# Patient Record
Sex: MALE | Race: WHITE | URBAN - METROPOLITAN AREA
[De-identification: names, ages, dates, MRNs, and addresses within clinical notes are randomized per-mention and may not be internally consistent; named-entity substitution may affect disease eponyms.]

---

## 2019-04-16 NOTE — PATIENT DISCUSSION
(H25.11) Age-related nuclear cataract, right eye - Assesment : Examination revealed cataract OD. Pt is bothered and symptomatic by vision and anisometropia. Pt leaving to go to [facility] for the Summer. - Plan : Monitor for changes. Advised patient of condition of cataract OD and explained anisometropia. Recommended CE OD to improve visual function and imbalance. Discussed option of proceeding in [facility] or when returns here in the Fall. Discussed procedure, risks, and benefits. Pt will consider option and where would like to proceed. RTC here in 1 year for Exam (or in Fall for Cat Eval; pt can call to schedule), sooner if problems or changes.

## 2019-04-16 NOTE — PATIENT DISCUSSION
(H35.373) Puckering of macula, bilateral - Assesment : Examination revealed ERM OU-mild. - Plan : Monitor for macular changes. Advised patient to call our office with decreased vision or increased symptoms.

## 2019-10-14 ENCOUNTER — IMPORTED ENCOUNTER (OUTPATIENT)
Dept: URBAN - METROPOLITAN AREA CLINIC 1 | Facility: CLINIC | Age: 59
End: 2019-10-14

## 2019-10-14 PROBLEM — H20.9: Noted: 2019-10-14

## 2019-10-14 PROCEDURE — 92002 INTRM OPH EXAM NEW PATIENT: CPT

## 2019-10-19 ENCOUNTER — IMPORTED ENCOUNTER (OUTPATIENT)
Dept: URBAN - METROPOLITAN AREA CLINIC 1 | Facility: CLINIC | Age: 59
End: 2019-10-19

## 2019-10-19 PROBLEM — H25.813: Noted: 2019-10-19

## 2019-10-19 PROBLEM — H20.9: Noted: 2019-10-19

## 2019-10-19 PROCEDURE — 92014 COMPRE OPH EXAM EST PT 1/>: CPT

## 2019-11-04 ENCOUNTER — IMPORTED ENCOUNTER (OUTPATIENT)
Dept: URBAN - METROPOLITAN AREA CLINIC 1 | Facility: CLINIC | Age: 59
End: 2019-11-04

## 2019-11-04 PROCEDURE — 92012 INTRM OPH EXAM EST PATIENT: CPT

## 2019-12-20 ENCOUNTER — IMPORTED ENCOUNTER (OUTPATIENT)
Dept: URBAN - METROPOLITAN AREA CLINIC 1 | Facility: CLINIC | Age: 59
End: 2019-12-20

## 2019-12-20 PROCEDURE — 92012 INTRM OPH EXAM EST PATIENT: CPT

## 2020-02-07 ENCOUNTER — IMPORTED ENCOUNTER (OUTPATIENT)
Dept: URBAN - METROPOLITAN AREA CLINIC 1 | Facility: CLINIC | Age: 60
End: 2020-02-07

## 2020-02-07 PROBLEM — H20.9: Noted: 2020-02-07

## 2020-02-07 PROCEDURE — 99213 OFFICE O/P EST LOW 20 MIN: CPT

## 2020-03-17 ENCOUNTER — IMPORTED ENCOUNTER (OUTPATIENT)
Dept: URBAN - METROPOLITAN AREA CLINIC 1 | Facility: CLINIC | Age: 60
End: 2020-03-17

## 2020-03-17 PROBLEM — H20.9: Noted: 2020-03-17

## 2020-03-17 PROCEDURE — 99213 OFFICE O/P EST LOW 20 MIN: CPT

## 2020-10-13 ENCOUNTER — IMPORTED ENCOUNTER (OUTPATIENT)
Dept: URBAN - METROPOLITAN AREA CLINIC 1 | Facility: CLINIC | Age: 60
End: 2020-10-13

## 2020-10-13 PROBLEM — H25.813: Noted: 2020-10-13

## 2020-10-13 PROBLEM — H16.143: Noted: 2020-10-13

## 2020-10-13 PROBLEM — H04.123: Noted: 2020-10-13

## 2020-10-13 PROBLEM — H10.45: Noted: 2020-10-13

## 2020-10-13 PROCEDURE — 92014 COMPRE OPH EXAM EST PT 1/>: CPT

## 2021-04-13 ENCOUNTER — IMPORTED ENCOUNTER (OUTPATIENT)
Dept: URBAN - METROPOLITAN AREA CLINIC 1 | Facility: CLINIC | Age: 61
End: 2021-04-13

## 2021-04-13 PROBLEM — H10.45: Noted: 2021-04-13

## 2021-04-13 PROBLEM — H04.123: Noted: 2021-04-13

## 2021-04-13 PROBLEM — H16.143: Noted: 2021-04-13

## 2021-04-13 PROBLEM — H25.813: Noted: 2021-04-13

## 2021-04-13 PROCEDURE — 92014 COMPRE OPH EXAM EST PT 1/>: CPT

## 2021-10-26 ENCOUNTER — IMPORTED ENCOUNTER (OUTPATIENT)
Dept: URBAN - METROPOLITAN AREA CLINIC 1 | Facility: CLINIC | Age: 61
End: 2021-10-26

## 2021-10-26 PROBLEM — H25.813: Noted: 2021-10-26

## 2021-10-26 PROBLEM — H10.45: Noted: 2021-10-26

## 2021-10-26 PROBLEM — H16.143: Noted: 2021-10-26

## 2021-10-26 PROBLEM — H04.123: Noted: 2021-10-26

## 2021-10-26 PROCEDURE — 92014 COMPRE OPH EXAM EST PT 1/>: CPT

## 2022-04-02 ASSESSMENT — VISUAL ACUITY
OS_CC: 20/20
OD_CC: 20/20
OS_CC: 20/30-1
OS_CC: 20/25+2
OD_CC: 20/25-1
OS_CC: 20/20
OD_CC: 20/20
OD_GLARE: 20/150
OD_CC: 20/20
OS_CC: 20/25
OD_CC: 20/20
OS_PH: SC 20/20
OS_CC: 20/20
OS_CC: 20/20
OD_GLARE: 20/80
OS_CC: 20/20
OS_GLARE: 20/400
OS_GLARE: 20/80
OS_CC: 20/25
OD_CC: 20/20
OD_PH: SC 20/20

## 2022-04-02 ASSESSMENT — TONOMETRY
OS_IOP_MMHG: 13
OS_IOP_MMHG: 8
OD_IOP_MMHG: 8
OD_IOP_MMHG: 12
OS_IOP_MMHG: 10
OS_IOP_MMHG: 12
OD_IOP_MMHG: 10
OS_IOP_MMHG: 13
OD_IOP_MMHG: 13
OD_IOP_MMHG: 13
OD_IOP_MMHG: 10
OS_IOP_MMHG: 9
OS_IOP_MMHG: 13
OS_IOP_MMHG: 13
OD_IOP_MMHG: 10
OD_IOP_MMHG: 12
OD_IOP_MMHG: 13
OS_IOP_MMHG: 11

## 2022-04-02 ASSESSMENT — KERATOMETRY
OD_K2POWER_DIOPTERS: 44.50
OS_AXISANGLE_DEGREES: 013
OS_K1POWER_DIOPTERS: 42.75
OS_K2POWER_DIOPTERS: 43.75
OD_K1POWER_DIOPTERS: 43.75
OD_AXISANGLE_DEGREES: 146
OD_AXISANGLE2_DEGREES: 056
OS_AXISANGLE2_DEGREES: 103

## 2022-04-26 ENCOUNTER — COMPREHENSIVE EXAM (OUTPATIENT)
Dept: URBAN - METROPOLITAN AREA CLINIC 1 | Facility: CLINIC | Age: 62
End: 2022-04-26

## 2022-04-26 DIAGNOSIS — H16.143: ICD-10-CM

## 2022-04-26 DIAGNOSIS — H25.813: ICD-10-CM

## 2022-04-26 DIAGNOSIS — H20.13: ICD-10-CM

## 2022-04-26 DIAGNOSIS — H04.123: ICD-10-CM

## 2022-04-26 DIAGNOSIS — H10.45: ICD-10-CM

## 2022-04-26 PROCEDURE — 92014 COMPRE OPH EXAM EST PT 1/>: CPT

## 2022-04-26 ASSESSMENT — KERATOMETRY
OD_AXISANGLE2_DEGREES: 056
OD_AXISANGLE_DEGREES: 146
OS_AXISANGLE_DEGREES: 013
OS_AXISANGLE2_DEGREES: 103
OD_K2POWER_DIOPTERS: 44.50
OS_K2POWER_DIOPTERS: 43.75
OS_K1POWER_DIOPTERS: 42.75
OD_K1POWER_DIOPTERS: 43.75

## 2022-04-26 ASSESSMENT — VISUAL ACUITY
OD_SC: 20/20
OS_SC: 20/20

## 2022-04-26 ASSESSMENT — TONOMETRY
OD_IOP_MMHG: 11
OS_IOP_MMHG: 11

## 2022-11-01 ENCOUNTER — COMPREHENSIVE EXAM (OUTPATIENT)
Dept: URBAN - METROPOLITAN AREA CLINIC 1 | Facility: CLINIC | Age: 62
End: 2022-11-01

## 2022-11-01 DIAGNOSIS — H25.813: ICD-10-CM

## 2022-11-01 DIAGNOSIS — H20.13: ICD-10-CM

## 2022-11-01 DIAGNOSIS — H16.143: ICD-10-CM

## 2022-11-01 DIAGNOSIS — H01.024: ICD-10-CM

## 2022-11-01 DIAGNOSIS — H04.123: ICD-10-CM

## 2022-11-01 DIAGNOSIS — H01.021: ICD-10-CM

## 2022-11-01 DIAGNOSIS — H10.45: ICD-10-CM

## 2022-11-01 PROCEDURE — 92015 DETERMINE REFRACTIVE STATE: CPT

## 2022-11-01 PROCEDURE — 92014 COMPRE OPH EXAM EST PT 1/>: CPT

## 2022-11-01 ASSESSMENT — VISUAL ACUITY
OD_SC: 20/20-2
OS_BAT: 20/80
OS_SC: 20/25
OD_BAT: 20/80

## 2022-11-01 ASSESSMENT — TONOMETRY
OS_IOP_MMHG: 12
OD_IOP_MMHG: 12

## 2022-11-01 ASSESSMENT — KERATOMETRY
OS_K2POWER_DIOPTERS: 43.75
OD_K1POWER_DIOPTERS: 43.75
OD_AXISANGLE_DEGREES: 146
OD_K2POWER_DIOPTERS: 44.50
OS_K1POWER_DIOPTERS: 42.75
OD_AXISANGLE2_DEGREES: 056
OS_AXISANGLE_DEGREES: 013
OS_AXISANGLE2_DEGREES: 103

## 2024-02-08 ENCOUNTER — COMPREHENSIVE EXAM (OUTPATIENT)
Dept: URBAN - METROPOLITAN AREA CLINIC 1 | Facility: CLINIC | Age: 64
End: 2024-02-08

## 2024-02-08 DIAGNOSIS — H25.813: ICD-10-CM

## 2024-02-08 DIAGNOSIS — H04.123: ICD-10-CM

## 2024-02-08 DIAGNOSIS — H10.45: ICD-10-CM

## 2024-02-08 DIAGNOSIS — H16.143: ICD-10-CM

## 2024-02-08 DIAGNOSIS — H20.13: ICD-10-CM

## 2024-02-08 PROCEDURE — 92014 COMPRE OPH EXAM EST PT 1/>: CPT

## 2024-02-08 ASSESSMENT — KERATOMETRY
OD_AXISANGLE2_DEGREES: 056
OS_K1POWER_DIOPTERS: 42.75
OS_AXISANGLE2_DEGREES: 103
OS_AXISANGLE_DEGREES: 013
OS_K2POWER_DIOPTERS: 43.75
OD_K2POWER_DIOPTERS: 44.50
OD_AXISANGLE_DEGREES: 146
OD_K1POWER_DIOPTERS: 43.75

## 2024-02-08 ASSESSMENT — VISUAL ACUITY
OD_SC: 20/25
OS_SC: 20/25

## 2024-02-08 ASSESSMENT — TONOMETRY
OD_IOP_MMHG: 12
OS_IOP_MMHG: 12

## 2024-06-13 NOTE — PATIENT DISCUSSION
(H52.31) Anisometropia - Assesment : Examination revealed anisometropia. - Plan : Discussed imbalance between the eyes could cause some dizziness, but would be more constant. Recommended CE OD to improve visual function and improve imbalance between the eyes. Patient's daughter Tia (on verbal) calling because she wanted to confirm a new CPAP was ordered for him at his appointment today. Please call, thank you!    OK to leave detailed message if you don't reach her, there was some confusion with the patient and brother that came if one was ordered or not.

## 2025-02-10 ENCOUNTER — COMPREHENSIVE EXAM (OUTPATIENT)
Age: 65
End: 2025-02-10

## 2025-02-10 DIAGNOSIS — H16.143: ICD-10-CM

## 2025-02-10 DIAGNOSIS — H04.123: ICD-10-CM

## 2025-02-10 DIAGNOSIS — H20.13: ICD-10-CM

## 2025-02-10 DIAGNOSIS — H01.024: ICD-10-CM

## 2025-02-10 DIAGNOSIS — H10.45: ICD-10-CM

## 2025-02-10 DIAGNOSIS — H01.021: ICD-10-CM

## 2025-02-10 DIAGNOSIS — H25.813: ICD-10-CM

## 2025-02-10 PROCEDURE — 92014 COMPRE OPH EXAM EST PT 1/>: CPT
